# Patient Record
Sex: FEMALE | Race: BLACK OR AFRICAN AMERICAN | NOT HISPANIC OR LATINO | URBAN - METROPOLITAN AREA
[De-identification: names, ages, dates, MRNs, and addresses within clinical notes are randomized per-mention and may not be internally consistent; named-entity substitution may affect disease eponyms.]

---

## 2024-03-14 ENCOUNTER — LAB (OUTPATIENT)
Dept: LAB | Facility: LAB | Age: 54
End: 2024-03-14
Payer: COMMERCIAL

## 2024-03-14 DIAGNOSIS — I10 ESSENTIAL (PRIMARY) HYPERTENSION: Primary | ICD-10-CM

## 2024-03-14 DIAGNOSIS — N95.0 POSTMENOPAUSAL BLEEDING: ICD-10-CM

## 2024-03-14 DIAGNOSIS — D50.9 IRON DEFICIENCY ANEMIA, UNSPECIFIED: ICD-10-CM

## 2024-03-14 DIAGNOSIS — M54.9 DORSALGIA, UNSPECIFIED: ICD-10-CM

## 2024-03-14 LAB
25(OH)D3 SERPL-MCNC: 16 NG/ML (ref 30–100)
ALBUMIN SERPL BCP-MCNC: 4.2 G/DL (ref 3.4–5)
ALP SERPL-CCNC: 106 U/L (ref 33–110)
ALT SERPL W P-5'-P-CCNC: 60 U/L (ref 7–45)
ANION GAP SERPL CALC-SCNC: 15 MMOL/L (ref 10–20)
AST SERPL W P-5'-P-CCNC: 53 U/L (ref 9–39)
BILIRUB SERPL-MCNC: 0.5 MG/DL (ref 0–1.2)
BUN SERPL-MCNC: 12 MG/DL (ref 6–23)
CALCIUM SERPL-MCNC: 9.6 MG/DL (ref 8.6–10.3)
CHLORIDE SERPL-SCNC: 105 MMOL/L (ref 98–107)
CHOLEST SERPL-MCNC: 241 MG/DL (ref 0–199)
CHOLESTEROL/HDL RATIO: 4.1
CO2 SERPL-SCNC: 25 MMOL/L (ref 21–32)
CREAT SERPL-MCNC: 1.01 MG/DL (ref 0.5–1.05)
EGFRCR SERPLBLD CKD-EPI 2021: 66 ML/MIN/1.73M*2
ERYTHROCYTE [DISTWIDTH] IN BLOOD BY AUTOMATED COUNT: 13.8 % (ref 11.5–14.5)
GLUCOSE SERPL-MCNC: 112 MG/DL (ref 74–99)
HCT VFR BLD AUTO: 42.7 % (ref 36–46)
HDLC SERPL-MCNC: 58.7 MG/DL
HGB BLD-MCNC: 13.7 G/DL (ref 12–16)
IRON SERPL-MCNC: 96 UG/DL (ref 35–150)
LDLC SERPL CALC-MCNC: 163 MG/DL
MCH RBC QN AUTO: 27.1 PG (ref 26–34)
MCHC RBC AUTO-ENTMCNC: 32.1 G/DL (ref 32–36)
MCV RBC AUTO: 85 FL (ref 80–100)
NON HDL CHOLESTEROL: 182 MG/DL (ref 0–149)
NRBC BLD-RTO: 0 /100 WBCS (ref 0–0)
PLATELET # BLD AUTO: 293 X10*3/UL (ref 150–450)
POTASSIUM SERPL-SCNC: 4.1 MMOL/L (ref 3.5–5.3)
PROT SERPL-MCNC: 7.2 G/DL (ref 6.4–8.2)
RBC # BLD AUTO: 5.05 X10*6/UL (ref 4–5.2)
SODIUM SERPL-SCNC: 141 MMOL/L (ref 136–145)
TRIGL SERPL-MCNC: 99 MG/DL (ref 0–149)
VLDL: 20 MG/DL (ref 0–40)
WBC # BLD AUTO: 8.6 X10*3/UL (ref 4.4–11.3)

## 2024-03-14 PROCEDURE — 36415 COLL VENOUS BLD VENIPUNCTURE: CPT

## 2024-03-14 PROCEDURE — 80053 COMPREHEN METABOLIC PANEL: CPT

## 2024-03-14 PROCEDURE — 83540 ASSAY OF IRON: CPT

## 2024-03-14 PROCEDURE — 80061 LIPID PANEL: CPT

## 2024-03-14 PROCEDURE — 85027 COMPLETE CBC AUTOMATED: CPT

## 2024-03-14 PROCEDURE — 82306 VITAMIN D 25 HYDROXY: CPT

## 2024-10-22 ENCOUNTER — OFFICE VISIT (OUTPATIENT)
Dept: ORTHOPEDIC SURGERY | Facility: CLINIC | Age: 54
End: 2024-10-22
Payer: COMMERCIAL

## 2024-10-22 DIAGNOSIS — M65.30 ACQUIRED TRIGGER FINGER: Primary | ICD-10-CM

## 2024-10-22 PROCEDURE — 20550 NJX 1 TENDON SHEATH/LIGAMENT: CPT | Mod: RT | Performed by: ORTHOPAEDIC SURGERY

## 2024-10-22 PROCEDURE — 1036F TOBACCO NON-USER: CPT | Performed by: ORTHOPAEDIC SURGERY

## 2024-10-22 PROCEDURE — 99204 OFFICE O/P NEW MOD 45 MIN: CPT | Performed by: ORTHOPAEDIC SURGERY

## 2024-10-22 PROCEDURE — 2500000004 HC RX 250 GENERAL PHARMACY W/ HCPCS (ALT 636 FOR OP/ED): Performed by: ORTHOPAEDIC SURGERY

## 2024-10-22 PROCEDURE — 99214 OFFICE O/P EST MOD 30 MIN: CPT | Mod: 25 | Performed by: ORTHOPAEDIC SURGERY

## 2024-10-22 RX ORDER — ATENOLOL 100 MG/1
100 TABLET ORAL DAILY
COMMUNITY

## 2024-10-22 RX ORDER — GABAPENTIN 100 MG/1
1 CAPSULE ORAL
COMMUNITY
Start: 2024-09-09

## 2024-10-22 RX ORDER — LIDOCAINE HYDROCHLORIDE 10 MG/ML
0.5 INJECTION, SOLUTION INFILTRATION; PERINEURAL
Status: COMPLETED | OUTPATIENT
Start: 2024-10-22 | End: 2024-10-22

## 2024-10-22 NOTE — PROGRESS NOTES
History present illness: Patient presents today for evaluation of painful triggering to the bilateral long fingers worse on the left as compared to the right.  This has been ongoing for about 8 months.  Left-hand-dominant.  Otherwise healthy.  No injury at onset.  She has noticed gradually increasing flexion posture to the left long finger at the proximal interphalangeal joint.      Past medical history: The patient's past medical history, family history, social history, and review of systems were documented on the patient medical intake.  The updated data was reviewed in the electronic medical record.  History is negative except otherwise stated in history of present illness.        Physical examination:  General: Alert and oriented to person, place, and time.  No acute distress and breathing comfortably: Pleasant and cooperative with examination.  HEENT: Head is normocephalic and atraumatic.  Neck: Supple, no visible swelling.  Cardiovascular: No palpable tachycardia  Lungs: No audible wheezing or labored breathing  Abdomen: Nondistended.  Extremities: Evaluation of the bilateral upper extremities finds the patient had palpable radial artery at the wrists with brisk capillary refill to all digits.  Patient has intact sensation to axillary radial median and ulnar nerves.  There are no open wounds.  There are no signs of infection.  There is no evidence of lymphedema or lymphatic streaking.  The patient has supple compartments to bilateral arm forearm and hand.  Approximately 15 degrees of flexion contracture notable to the left long finger PIP joint.  Focal tenderness and mechanical triggering overlying A1 pulleys to bilateral long fingers.      Radiology:      Assessment: Bilateral long finger trigger digit complicated on the left by worsening PIP flexion contracture      Plan: Treatment options were discussed.  We talked about operative and nonoperative strategies.  On the left recommendations were made for  surgical treatment given the compounding problem of PIP flexion contracture that seems to be worsening over time.  We talked about intraoperative techniques and postoperative protocols.  Patient elects to proceed forth with left long finger trigger finger release with plans for wide-awake approach to anesthesia.  On the right she elects for steroid injection to the trigger.  Will continue to follow both over time.        Procedure:  Hand / UE Inj/Asp: R long A1 for trigger finger on 10/22/2024 2:06 PM  Indications: pain and tendon swelling  Details: 25 G needle, volar approach  Medications: 5 mg triamcinolone acetonide 10 mg/mL; 0.5 mL lidocaine 10 mg/mL (1 %)  Outcome: tolerated well, no immediate complications  Procedure, treatment alternatives, risks and benefits explained, specific risks discussed. Consent was given by the patient. Immediately prior to procedure a time out was called to verify the correct patient, procedure, equipment, support staff and site/side marked as required. Patient was prepped and draped in the usual sterile fashion.

## 2024-11-06 ENCOUNTER — TELEPHONE (OUTPATIENT)
Dept: ORTHOPEDIC SURGERY | Facility: CLINIC | Age: 54
End: 2024-11-06
Payer: COMMERCIAL

## 2024-11-06 NOTE — LETTER
November 6, 2024     Patient: Summer Schrader   YOB: 1970   Date of Visit: 11/6/2024       To Whom It May Concern:    Summer Schrader has been under my care for trigger finger of her left hand.  She is scheduled to have trigger finger release on 12/6/2024.  Please excuse her form work on this date through 12/16/2024 to attend surgery and have time to heal.      If you have any questions or concerns, please don't hesitate to call, [phone].         Sincerely,         Pedrito Torres, DO

## 2024-11-06 NOTE — TELEPHONE ENCOUNTER
The patient needs a note for her job regarding her surgery, she is having surgery on Friday, 12/6/2024 and returning to work on Monday, 12/16/2024.    Thank you :-)

## 2024-11-11 ENCOUNTER — APPOINTMENT (OUTPATIENT)
Dept: RADIOLOGY | Facility: HOSPITAL | Age: 54
End: 2024-11-11
Payer: COMMERCIAL

## 2024-11-11 DIAGNOSIS — Z12.31 SCREENING MAMMOGRAM FOR BREAST CANCER: ICD-10-CM

## 2024-12-05 DIAGNOSIS — G89.18 POST-OP PAIN: Primary | ICD-10-CM

## 2024-12-05 RX ORDER — HYDROCODONE BITARTRATE AND ACETAMINOPHEN 5; 325 MG/1; MG/1
1 TABLET ORAL EVERY 8 HOURS PRN
Qty: 6 TABLET | Refills: 0 | Status: SHIPPED | OUTPATIENT
Start: 2024-12-05 | End: 2024-12-07

## 2024-12-06 PROCEDURE — 26055 INCISE FINGER TENDON SHEATH: CPT | Performed by: ORTHOPAEDIC SURGERY

## 2024-12-19 ENCOUNTER — OFFICE VISIT (OUTPATIENT)
Dept: ORTHOPEDIC SURGERY | Facility: CLINIC | Age: 54
End: 2024-12-19
Payer: COMMERCIAL

## 2024-12-19 DIAGNOSIS — M65.30 ACQUIRED TRIGGER FINGER: Primary | ICD-10-CM

## 2024-12-19 DIAGNOSIS — M25.642 FINGER STIFFNESS, LEFT: ICD-10-CM

## 2024-12-19 PROCEDURE — 99211 OFF/OP EST MAY X REQ PHY/QHP: CPT | Performed by: ORTHOPAEDIC SURGERY

## 2024-12-19 NOTE — PROGRESS NOTES
12/19/2024    Chief Complaint   Patient presents with    Left Hand - Follow-up     DOS 12.06.24  Long finger TFR       History of Present Illness:  Patient Summer Schrader , 54 y.o. female, presents today, 12/19/2024, for evaluation of left middle finger  trigger digit release, 2 weeks out with continued mild persistence of PIP flexion contracture.  Minimal soreness discomfort.  She feels that range of motion has been improving over time.  No mechanical symptoms in the interval since surgery.  Denies any fevers, chills, constitutional symptoms .         Review of Systems:   GENERAL: Negative  GI: Negative  MUSCULOSKELETAL: See HPI  SKIN: Negative  NEURO:  Negative     Physical Exam:  GENERAL:  Alert and oriented to person, place, and time.  No acute distress and breathing comfortably; pleasant and cooperative with the examination.  HEENT:  Head is normocephalic and atraumatic.  NECK:  Supple, no visible swelling.  CARDIOVASCULAR:  No palpable tachycardia.  LUNGS:  No audible wheezing or labored breathing.  ABDOMEN:  Nondistended.  Extremities: The surgical incision is clean, dry, intact, and appears to be healing well.  No active bleeding, erythema, warmth, drainage, or signs of infection.  Appropriate functional ROM demonstrated with flexion/extension of the digits, and flexion/extension/pronosupination of the wrist.  She has about 10 degrees of flexion contracture across the PIP of the left long finger but is passively correctable.     Imaging/Test Results:  None today.       Assessment:  Left long finger trigger digit release with PIP flexion contracture 2 weeks out from surgery.     Plan:  Sutures were removed in the office today.  The patient can begin to weight bear as tolerated.  We discussed and reviewed home exercise program for range of motion recovery, scar massage, and desensitization techniques.  They can return to activities as tolerated.  Will give her formal therapy referral to use as needed  but she will work aggressively on active and passive techniques for increasing extension across PIP.  She will contact therapy in a few weeks if she feels she is not where she wants to be.  The patient will follow-up with our office in 6 weeks if she has any ongoing concerns, if she is doing well she will follow-up on an as needed basis.  All patient questions answered at today's visit.    Kelly Church PA-C

## 2025-01-22 ENCOUNTER — LAB (OUTPATIENT)
Dept: LAB | Facility: LAB | Age: 55
End: 2025-01-22

## 2025-01-22 ENCOUNTER — EVALUATION (OUTPATIENT)
Dept: OCCUPATIONAL THERAPY | Facility: CLINIC | Age: 55
End: 2025-01-22
Payer: COMMERCIAL

## 2025-01-22 DIAGNOSIS — E78.00 PURE HYPERCHOLESTEROLEMIA: ICD-10-CM

## 2025-01-22 DIAGNOSIS — R73.9 HYPERGLYCEMIA, UNSPECIFIED: ICD-10-CM

## 2025-01-22 DIAGNOSIS — M25.60 JOINT STIFFNESS: Primary | ICD-10-CM

## 2025-01-22 DIAGNOSIS — D51.0 VITAMIN B12 DEFICIENCY ANEMIA DUE TO INTRINSIC FACTOR DEFICIENCY: ICD-10-CM

## 2025-01-22 DIAGNOSIS — R74.01 ELEVATION OF LEVELS OF LIVER TRANSAMINASE LEVELS: Primary | ICD-10-CM

## 2025-01-22 LAB
ALBUMIN SERPL BCP-MCNC: 4.3 G/DL (ref 3.4–5)
ALP SERPL-CCNC: 96 U/L (ref 33–110)
ALT SERPL W P-5'-P-CCNC: 15 U/L (ref 7–45)
ANION GAP SERPL CALC-SCNC: 10 MMOL/L (ref 10–20)
AST SERPL W P-5'-P-CCNC: 16 U/L (ref 9–39)
BILIRUB SERPL-MCNC: 0.5 MG/DL (ref 0–1.2)
BUN SERPL-MCNC: 9 MG/DL (ref 6–23)
CALCIUM SERPL-MCNC: 9.8 MG/DL (ref 8.6–10.3)
CHLORIDE SERPL-SCNC: 107 MMOL/L (ref 98–107)
CHOLEST SERPL-MCNC: 205 MG/DL (ref 0–199)
CHOLESTEROL/HDL RATIO: 3.8
CO2 SERPL-SCNC: 27 MMOL/L (ref 21–32)
CREAT SERPL-MCNC: 1 MG/DL (ref 0.5–1.05)
EGFRCR SERPLBLD CKD-EPI 2021: 67 ML/MIN/1.73M*2
EST. AVERAGE GLUCOSE BLD GHB EST-MCNC: 128 MG/DL
GLUCOSE SERPL-MCNC: 105 MG/DL (ref 74–99)
HBA1C MFR BLD: 6.1 %
HDLC SERPL-MCNC: 53.4 MG/DL
LDLC SERPL CALC-MCNC: 137 MG/DL
NON HDL CHOLESTEROL: 152 MG/DL (ref 0–149)
POTASSIUM SERPL-SCNC: 4.1 MMOL/L (ref 3.5–5.3)
PROT SERPL-MCNC: 7.3 G/DL (ref 6.4–8.2)
SODIUM SERPL-SCNC: 140 MMOL/L (ref 136–145)
TRIGL SERPL-MCNC: 71 MG/DL (ref 0–149)
VIT B12 SERPL-MCNC: 317 PG/ML (ref 211–911)
VLDL: 14 MG/DL (ref 0–40)

## 2025-01-22 PROCEDURE — 80061 LIPID PANEL: CPT

## 2025-01-22 PROCEDURE — 97110 THERAPEUTIC EXERCISES: CPT | Mod: GO

## 2025-01-22 PROCEDURE — 83036 HEMOGLOBIN GLYCOSYLATED A1C: CPT

## 2025-01-22 PROCEDURE — 97165 OT EVAL LOW COMPLEX 30 MIN: CPT | Mod: GO

## 2025-01-22 PROCEDURE — 82607 VITAMIN B-12: CPT

## 2025-01-22 PROCEDURE — 80053 COMPREHEN METABOLIC PANEL: CPT

## 2025-01-22 NOTE — PROGRESS NOTES
Occupational Therapy Upper Extremity Evaluation Note    Date: 2025    Time In:803  Time Out:849  Total Time: 46 minutes    Charges:  HC OT OCCUPATIONAL THERAPY EVAL LOW COMPLEX 30 MINS  77242 (CPT®)  HC OT THERAPEUTIC EXERCISES  20954 (CPT®) 15 minutes 1 unit  PIP dynamic extension orthosis    NAME: Summer Schrader  : 1970  MRN: [id number]    Chart reviewed: yes    Referring Physician:  Dr Torres for: ROM, scar and edema management, strengtheneing  Diagnosis: Left long trigger finger   Date of  Surgery: 2024                (6 weeks and 5 days post-surgery)  Precautions: WBAT     Insurance  [place] [employer]  Visit Number:1  Visits Allowed: n/a  Authorization: not needed  Date Range:n/a    Subjective  Prior level of function:independent  Current level of function:Patient reports her finger is stiff. She is currently working as  and is constantly typing. Patient lives with  and daughter.  Pain 0/10-just achy   pain description,and Location: whole middle finger and tingling intermittent  Chief Complaint: stiffness and unable to straighten finger, per patient  Patient's goal for therapy: Improve motion per patient  Patient's preferred learning style: visual, auditory, read/written, kinesthetic  Outcome Measure:  Initial evaluation Quick DASH 22.73%    Objective  Observation: Patient not guarding her left hand and is writing with her hand   Clinical presentation: stable  Skin/ Wound/Scar: tender and mildly adhered  Edema: Left middle mild edema  Sensation: patient reports tingling intermittent, intact to light touch   Dexterity/ Coordination: Independent with buttoning, tying, and zipping    Upper Extremity ROM   Elbow extension/flexion: Full  Forearm supination/pronation: Full  Wrist extension/flexion: Full  Radial deviation/ulnar deviation: Full    Left Hand ROM  AROM   THUMB      K[place]andji 10    Palmar Abduction     Radial Abduction      Finger (DPC)        Index Middle  Ring Small    DPC 1.8 DPC DPC     AROM    Left middle   MP 0/64  PIP 18/94   DIP -8/66    PROM    Left middle   MP 0/86  PIP 0/100  DIP 0/66               Hand Strength   left hand dominant                Gross grasp(dynamometer) (lbs)                             (2nd setting) Right   35      Left 12                Pinch (lbs)                             Key  right 12  left 9                            Sosa   right  9   left 5    ADLS/IADLS: independent      Treatment Completed this Date: Patient instructed in scar and edema management.  Issued dycem and instructed in scar mobilizationShe was fitted with Coban with purpose, wearing schedule, precautions and care discussed for edema reduction. Patient instructed in tendon gliding, lateral band stretching exercises .  Issued soft putty ( yellow), instructed in exercises, and she completed them correctly with good tolerance. Patient also fitted with LMB with purpose, wearing schedule, precautions and care discussed.      HEP- see treatment    Assessment  Patient is a 55 y/o left hand  dominate   female diagnosed with  left middle finger trigger  and is  6 weeks 5 days post-operative left middle finger trigger release. Patient reports aching in her left middle finger and decreased independence with ADLs/ IADLs. Patient demonstrates decreased ROM, edema, tender and adhered scar, decreased functional strength,  newly healing structure requiring orthosis, and decreased independence with ADLs/IADLs  per Quick DASH score. Patient will benefit from attending occupational therapy to improve functional use of her left hand.     Plan of Care  Goals to be achieved by 5  weeks    Patient's level of independence with ADLs/ IADLs will improve by at least 50% per the quick DASH by discharge.    Patient will demonstrate full flexion and full extension of her left middle finger to improve participation in ADLs/IADLs by discharge.    Patient will report understanding of home program,  demonstrate independence and verbalize precautions.    Patient will report follow through with wearing of orthosis as instructed by therapist.    Patient will report pain free use of hand for completing ADLs/IADLS by discharge.    Patient's scar will be supple and demonstrate good healing that does not limit function by discharge.     Patient's gross grasp strength, per Dynamometer 2nd setting, will be within 5# of non-affected hand to complete ADLs/IADLs with increased independence by discharge.    Patient's pinch strengths will improve per pinch gauge to improve functional pinch strength for completing ADLs/IADLs by discharge.    Intervention    Therapeutic exercises, Therapeutic activity, manual therapy, orthosis, fluidotherapy, paraffin, taping, patient education, home program    Rehabilitation Potential:   good    Potential limiting factors for rehabilitation: none    Plan  Frequency 1-2/week  Duration 5 weeks    Patient and therapist developed goals for therapy and patient verbalizing agreement to plan of care

## 2025-01-30 ENCOUNTER — OFFICE VISIT (OUTPATIENT)
Dept: ORTHOPEDIC SURGERY | Facility: CLINIC | Age: 55
End: 2025-01-30
Payer: COMMERCIAL

## 2025-01-30 DIAGNOSIS — M65.30 ACQUIRED TRIGGER FINGER: Primary | ICD-10-CM

## 2025-01-30 DIAGNOSIS — M25.642 FINGER STIFFNESS, LEFT: ICD-10-CM

## 2025-01-30 PROCEDURE — 99211 OFF/OP EST MAY X REQ PHY/QHP: CPT | Performed by: ORTHOPAEDIC SURGERY

## 2025-01-30 NOTE — PROGRESS NOTES
1/30/2025    Chief Complaint   Patient presents with    Left Hand - Follow-up     DOS 12.06.24  Long finger TFR         History of Present Illness:  Patient Summer Schrader , 54 y.o. female, presents today, 1/30/2025, for evaluation of left middle finger  trigger digit release, nearly 2 months post-op.  She just got into OT and was given a capner splint and is working HEP as well and is eagerly looking forward to more visits.  She has continues stiffness, but over this and pain are improved .         Review of Systems:   GENERAL: Negative  GI: Negative  MUSCULOSKELETAL: See HPI  SKIN: Negative  NEURO:  Negative     Physical Exam:  GENERAL:  Alert and oriented to person, place, and time.  No acute distress and breathing comfortably; pleasant and cooperative with the examination.  HEENT:  Head is normocephalic and atraumatic.  NECK:  Supple, no visible swelling.  CARDIOVASCULAR:  No palpable tachycardia.  LUNGS:  No audible wheezing or labored breathing.  ABDOMEN:  Nondistended.  Extremities: Evaluation of left upper extremity finds the patient to have a palpable radial artery at the wrist with brisk capillary refill to all digits. The patient has intact sensorium to axillary, radial, median and ulnar nerves. There are no open wounds. There are no signs of infection. There is no evidence of lymphedema or lymphatic streaking. The patient has supple compartments of the left arm, forearm and hand. Incision is well healed.  She is still stiff to PIP flexion with about 20-25 degrees of contracture.       Imaging/Test Results:  None today.     Assessment:  Left long finger trigger digit release, about 2 months post-op with continued PIP stiffness.     Plan:  Continue WBAT.  Continue OT and HEP for scar massage, desensitization and aggressive ROM recovery with dynamic and static techniques.

## 2025-02-05 ENCOUNTER — TREATMENT (OUTPATIENT)
Dept: OCCUPATIONAL THERAPY | Facility: CLINIC | Age: 55
End: 2025-02-05
Payer: COMMERCIAL

## 2025-02-05 DIAGNOSIS — M25.60 JOINT STIFFNESS: Primary | ICD-10-CM

## 2025-02-05 PROCEDURE — 97110 THERAPEUTIC EXERCISES: CPT | Mod: GO

## 2025-02-05 NOTE — PROGRESS NOTES
Occupational Therapy Upper Extremity Progress Note    Date: 2025    Time In:1552  Time out: 1626  Total Time: 34 minutes   minutes    Charges:    HC OT THERAPEUTIC EXERCISES  74716 (CPT®) 34  minutes 2 units    NAME: Summer Schrader  : 1970  MRN: [id number]    Chart reviewed: yes    Referring Physician:  Dr Torres for: ROM, scar and edema management, strengtheneing  Diagnosis: Left long trigger finger   Date of  Surgery: 2024                (8 weeks and 5 days post-surgery)  Precautions: WBAT     Insurance  [employer] [employer]  Visit Number:2  Visits Allowed: BMN  Authorization: not needed  Date Range:n/a    Subjective  Patient reports she is not having discomfort with doing certain activities as before. She reports follow through with wearing LMB. She states she is ready to be discharged.    At initial evaluation:  Prior level of function:independent  Current level of function:Patient reports her finger is stiff. She is currently working as  and is constantly typing. Patient lives with  and daughter.  Pain 0/10-just achy   pain description,and Location: whole middle finger and tingling intermittent  Chief Complaint: stiffness and unable to straighten finger, per patient  Patient's goal for therapy: Improve motion per patient  Patient's preferred learning style: visual, auditory, read/written, kinesthetic  Outcome Measure:  Initial evaluation Quick DASH 22.73%, Discharge Quick DASH  13.64%    Objective  Observation: Patient not guarding her left hand.   Clinical presentation: stable  Skin/ Wound/Scar: tender and minimally adhered  Edema: Left middle mild edema  Sensation: patient reports tingling intermittent, intact to light touch   Dexterity/ Coordination: Independent with buttoning, tying, and zipping    Upper Extremity ROM   Elbow extension/flexion: Full  Forearm supination/pronation: Full  Wrist extension/flexion: Full  Radial deviation/ulnar deviation: Full    Left Hand  ROM  AROM   THUMB      Kapandji 10    Palmar Abduction     Radial Abduction      Finger (DPC)        Index Middle Ring Small    DPC DPC DPC DPC     AROM    Left middle   MP 0/70  PIP 2/112   DIP -8/80 (volar 6 for PIP extension)  PROM PIP extension full    AROM     Right  Middle   MP 0/76    0/112  DIP -10/70    Hand Strength   left hand dominant                Gross grasp(dynamometer) (lbs)                             (2nd setting) Right   35   Left 30*                Pinch (lbs)                             Key  right 12  left 13                            Sosa   right  9   left 11    ADLS/IADLS: independent      Treatment Completed this Date:  ROM and hand strength evaluated with gains noted. Discussed findings with patient.  Issued moderate resistive  putty (green), re-instructed in exercises, and she completed them correctly with good tolerance. Patient's LMB tension increased. Patient received fluidotherapy with LMB on for heat and stretch middle finger PIP extension.    HEP- see treatment    Assessment  Patient is a 55 y/o left hand  dominate   female diagnosed with  left middle finger trigger  and is  8 weeks 5 days post-operative left middle finger trigger release. Patient reports follow through with home program and increased functional use of her hand. Patient demonstrates full composite flexion of her middle finger and improvement in PIP extension. She nearly met all goals and is discharged to home program for regaining final degrees for PIP extension with LMB and reverse blocking with increased resistive theraputty.    Plan of Care  Goals to be achieved by 5  weeks    Patient's level of independence with ADLs/ IADLs will improve by at least 50% per the quick DASH by discharge. Nearly  Met    Patient will demonstrate full flexion and full extension of her left middle finger to improve participation in ADLs/IADLs by discharge.Nearly met    Patient will report understanding of home program, demonstrate  independence and verbalize precautions.Met    Patient will report follow through with wearing of orthosis as instructed by therapist. Met    Patient will report pain free use of hand for completing ADLs/IADLS by discharge. Met    Patient's scar will be supple and demonstrate good healing that does not limit function by discharge. Nearly Met    Patient's gross grasp strength, per Dynamometer 2nd setting, will be within 5# of non-affected hand to complete ADLs/IADLs with increased independence by discharge.Met    Patient's pinch strengths will improve per pinch gauge to improve functional pinch strength for completing ADLs/IADLs by discharge.Met    Intervention    Therapeutic exercises, Therapeutic activity, manual therapy, orthosis, fluidotherapy, paraffin, taping, patient education, home program    Rehabilitation Potential:   good    Potential limiting factors for rehabilitation: none    Plan  Frequency 1-2/week  Duration 5 weeks    Patient and therapist developed goals for therapy and patient verbalizing agreement to plan of care